# Patient Record
Sex: FEMALE | Race: AMERICAN INDIAN OR ALASKA NATIVE
[De-identification: names, ages, dates, MRNs, and addresses within clinical notes are randomized per-mention and may not be internally consistent; named-entity substitution may affect disease eponyms.]

---

## 2019-03-15 ENCOUNTER — HOSPITAL ENCOUNTER (EMERGENCY)
Dept: HOSPITAL 42 - ED | Age: 19
Discharge: LEFT BEFORE DISCHARGE | End: 2019-03-15
Payer: SELF-PAY

## 2019-03-15 VITALS — OXYGEN SATURATION: 99 % | DIASTOLIC BLOOD PRESSURE: 76 MMHG | SYSTOLIC BLOOD PRESSURE: 117 MMHG | HEART RATE: 83 BPM

## 2019-03-15 VITALS — RESPIRATION RATE: 18 BRPM | TEMPERATURE: 98.9 F

## 2019-03-15 VITALS — BODY MASS INDEX: 36 KG/M2

## 2019-03-15 DIAGNOSIS — M25.512: Primary | ICD-10-CM

## 2019-03-15 NOTE — ED PDOC
Arrival/HPI





- General


Chief Complaint: Upper Extremity Problem/Injury


Time Seen by Provider: 03/15/19 18:37


Historian: Patient





- History of Present Illness


Narrative History of Present Illness (Text): 





03/15/19 19:39


18-year-old female complaining of left shoulder pain radiating down her left 

hand for the past 2 days.  Patient reports that the pain is burning sensation.  

Otherwise reports no trauma, injury, fever, neck pain, chest pain, back pain, 

numbness, weakness, other joint pain.





Past Medical History





- Infectious Disease


Hx of Infectious Diseases: None





- Pulmonary


Hx Asthma: Yes





- Neurological


Hx Migraine: Yes





- Psychiatric


Hx Substance Use: No





Family/Social History


Family/Social History: No Known Family HX


Smoking Status: Never Smoked


Hx Alcohol Use: No


Hx Substance Use: No





Allergies/Home Meds


Allergies/Adverse Reactions: 


Allergies





No Known Allergies Allergy (Verified 03/15/19 18:31)


   











Review of Systems





- Review of Systems


Constitutional: absent: Fatigue, Fevers


Respiratory: absent: SOB, Cough


Cardiovascular: absent: Chest Pain, Palpitations


Gastrointestinal: absent: Abdominal Pain, Nausea, Vomiting


Genitourinary Female: absent: Dysuria, Frequency


Musculoskeletal: Arthralgias.  absent: Back Pain, Neck Pain


Skin: absent: Rash, Skin Lesions


Neurological: absent: Headache, Dizziness





Physical Exam





Vital Signs











  Temp Pulse Resp BP Pulse Ox


 


 03/15/19 18:26  98.9 F  71  18  146/72  100











Temperature: Afebrile


Blood Pressure: Normal


Pulse: Regular


Respiratory Rate: Normal


Appearance: Positive for: Well-Appearing, Non-Toxic, Comfortable


Pain Distress: None


Mental Status: Positive for: Alert and Oriented X 3





- Systems Exam


Head: Present: Atraumatic, Normocephalic


Pupils: Present: PERRL


Extroacular Muscles: Present: EOMI


Conjunctiva: Present: Normal


Mouth: Present: Moist Mucous Membranes


Neck: Present: Normal Range of Motion.  No: Meningeal Signs, MIDLINE TENDERNESS,

Paraspinal Tenderness, Lymphadenopathy


Respiratory/Chest: Present: Clear to Auscultation, Good Air Exchange.  No: 

Respiratory Distress, Accessory Muscle Use


Cardiovascular: Present: Regular Rate and Rhythm, Normal S1, S2.  No: Murmurs


Back: Present: Normal Inspection.  No: Midline Tenderness


Upper Extremity: Present: Normal Inspection, Normal ROM, NORMAL PULSES, 

Tenderness (+mild tenderness to the L shoulder), Neurovascularly Intact, 

Capillary Refill < 2s, Norm 2-Pt Discrimination.  No: Cyanosis, Edema, Swelling,

Erythema, Temperature Abnormalties, Deformity


Lower Extremity: Present: Normal Inspection.  No: Edema


Neurological: Present: GCS=15, CN II-XII Intact, Speech Normal, Motor Func 

Grossly Intact, Normal Sensory Function


Skin: Present: Warm, Dry, Normal Color.  No: Rashes


Psychiatric: Present: Alert, Oriented x 3, Normal Insight, Normal Concentration





Medical Decision Making


ED Course and Treatment: 





03/15/19 19:41


Plan : 


- XR L shoulder


- XR C spine


- Toradol IM


- Lidoderm patch


- EKG








EKG: NSR at 69 bpm, (-) acute ST changes, as read by PA.








Patient is refusing to give a urine specimen for urine hCG.  Now she is also 

refusing analgesic medications and x-rays.  Patient states that she wants to 

leave the emergency room against medical advice. 





The patient is choosing to leave against medical advice.  I have personally 

explained to the patient that choosing to do so may result in permanent bodily 

harm or death.  I have discussed at great length that without further evaluation

and monitoring there may be unforeseen circumstances and/or deterioration 

causing permanent bodily harm or death as a result of their choice. The patient 

is alert, oriented, and shows the mental capacity to make clear decisions 

regarding the patients health care at this time. The patient continues to wish 

to leave against medical advice.  


In light of the patients decision to leave against medical advice, the patient 

was encouraged to follow-up with pmd and the patient is aware of the importance 

to following up as instructed.  The patient has been advised that they should 

return to the emergency room immediately if they change their mind at any time, 

or if their condition begins to change or worsen in any way.














- RAD Interpretation


Radiology Orders: 











03/15/19 19:03


CERVICAL SPINE AP & LATERAL [RAD] Stat 


SHOULDER LEFT [RAD] Stat 














- Medication Orders


Current Medication Orders: 











Lidocaine (Lidoderm)  1 ea TD DAILY BLADIMIR





Discontinued Medications





Ketorolac Tromethamine (Toradol)  60 mg IM STAT STA


   Stop: 03/15/19 19:06


   Last Admin: 03/15/19 19:08 Dose:  Not Given


   Non-Admin Reason: Patient Refused











- PA / NP / Resident Statement


MD/DO has reviewed & agrees with the documentation as recorded.





Disposition/Present on Arrival





- Present on Arrival


Any Indicators Present on Arrival: No


History of DVT/PE: No


History of Uncontrolled Diabetes: No


Urinary Catheter: No


History of Decub. Ulcer: No


History Surgical Site Infection Following: None





- Disposition


Have Diagnosis and Disposition been Completed?: Yes


Diagnosis: 


 Left shoulder pain





Disposition: AGAINST MEDICAL ADVICE


Disposition Time: 19:20


Patient Plan: Other (Patient leaving AMA)


Patient Problems: 


                             Current Active Problems











Problem Status Onset


 


Left shoulder pain Acute 











Condition: STABLE


Discharge Instructions (ExitCare):  Shoulder Pain (DC), Leaving Against Medical 

Advice


Additional Instructions: 


Thank you for letting us take care of you today. You were treated for L shoulder

 pain. The emergency medical care you received today was directed at your acute 

symptoms. If you were prescribed any medication, please fill it and take as 

directed. It may take several days for your symptoms to resolve. Return to the 

Emergency Department if your symptoms worsen, do not improve, or if you have any

 other problems.





Please contact your doctor in 2 days for re-evaluation and follow up / or call 

one of the physicians/clinics you have been referred to that are listed on the 

Patient Visit Information form that is included in your discharge packet. Bring 

any paperwork you were given at discharge with you along with any medications 

you are taking to your follow up visit. Our treatment cannot replace ongoing 

medical care by a primary care provider (PCP) outside of the emergency 

department.





Thank you for allowing the [facility] team to be part of your care today.





Prescriptions: 


Cyclobenzaprine [Cyclobenzaprine HCl] 10 mg PO TID PRN #15 tab


 PRN Reason: Muscle Spasm


Naproxen 500 mg PO BID #30 tab


Referrals: 


Baljit Connelly MD [Primary Care Provider] - Follow up with primary


Forms:  [employer] (English)

## 2019-03-16 NOTE — CARD
--------------- APPROVED REPORT --------------





Date of service: 03/15/2019



EKG Measurement

Heart [id number]

[id number]

[id number]

[id number]

[id number]



<Conclusion>

Normal sinus rhythm with sinus arrhythmia

Normal ECG